# Patient Record
Sex: MALE | Race: WHITE
[De-identification: names, ages, dates, MRNs, and addresses within clinical notes are randomized per-mention and may not be internally consistent; named-entity substitution may affect disease eponyms.]

---

## 2020-07-13 ENCOUNTER — HOSPITAL ENCOUNTER (EMERGENCY)
Dept: HOSPITAL 26 - MED | Age: 35
Discharge: HOME | End: 2020-07-13
Payer: SELF-PAY

## 2020-07-13 VITALS — DIASTOLIC BLOOD PRESSURE: 80 MMHG | SYSTOLIC BLOOD PRESSURE: 120 MMHG

## 2020-07-13 VITALS — BODY MASS INDEX: 21.7 KG/M2 | HEIGHT: 71 IN | WEIGHT: 155 LBS

## 2020-07-13 VITALS — SYSTOLIC BLOOD PRESSURE: 146 MMHG | DIASTOLIC BLOOD PRESSURE: 77 MMHG

## 2020-07-13 DIAGNOSIS — I10: ICD-10-CM

## 2020-07-13 DIAGNOSIS — K04.7: Primary | ICD-10-CM

## 2020-07-13 NOTE — NUR
Patient discharged with v/s stable. Written and verbal after care instructions 
given and explained regarding toothache.

Patient alert, oriented and verbalized understanding of instructions. 
Ambulatory with steady gait. All questions addressed prior to discharge. ID 
band removed. Patient advised to follow up with PMD. Rx of augmentin norco 
given. Patient educated on indication of medication including possible reaction 
and side effects. Opportunity to ask questions provided and answered.